# Patient Record
(demographics unavailable — no encounter records)

---

## 2025-01-07 NOTE — REASON FOR VISIT
[Follow-Up] : a follow-up visit [Sleep Apnea] : sleep apnea [TextEntry] : Patient states he has been using the CPAP but he does not see a significant difference in his quality of sleep.

## 2025-01-07 NOTE — DISCUSSION/SUMMARY
[FreeTextEntry1] : Mr. Corcoran has severe sleep apnea.  He is using current machine and feels fair.  He does require high pressures and has discomfort with the mask.  I discussed with him possible in lab CPAP titration he defers at this time.  He is finding significant humidity in the tubing and I have asked him to decrease humidifier and placed the machine and the tubing well below the level of the mass of the water will run down.  He will try this for another 3 months. The patient understands and agrees with plan of care. Today's office visit encompassed 32 minutes which excludes teaching and separately reported services.. I conducted an extensive history, physical exam and reviewed diagnosis and treatment options including diagnostic tests,radiology studies including cat scans and the use of prescription medication.

## 2025-01-07 NOTE — HISTORY OF PRESENT ILLNESS
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

## 2025-01-07 NOTE — PROCEDURE
[FreeTextEntry1] : Compliance report 11/18/2024 to 1/6/2025 96% of days used.  92% of days greater than 4 hours.  AHI 6.9.  There is good compliance with fair to good results.  Median pressure 13 maximum 18 pressure set 5-20.

## 2025-04-17 NOTE — DISCUSSION/SUMMARY
[FreeTextEntry1] : Mr Corcoran has severe obstructive sleep apnea.  He is using machine properly with an AHI 3.9 which is excellent compliance and acceptable results he still snores and does not feel that he is as well rested as he would hope.  He is taking Zyrtec which is sedating in some instances.  I favor holding the Zyrtec and starting loratadine for the next several weeks.  If this is not helpful then I recommend an in-lab CPAP titration.  He once went to an in-lab study and was very costly.  I encouraged him to investigate the cost through his insurance company.  He will try the loratadine and inform me of the results. The patient understands and agrees with plan of care. Today's office visit encompassed 32 minutes which excludes teaching and separately reported services.. I conducted an extensive history, physical exam and reviewed diagnosis and treatment options including diagnostic tests,radiology studies including cat scans and the use of prescription medication.   NB patient is using CPAP and is benefiting from therapy.  Has some somnolence which I think is medication induced.  Revealed marked improvement in his AHI.  Patient should continue CPAP at this time.

## 2025-04-17 NOTE — PROCEDURE
[FreeTextEntry1] : Compliance report 1/8/2025 through 4/7/2020 2597% of days used 97% of days greater than 4 hours AHI 3.9 this is excellent compliance.

## 2025-04-17 NOTE — HISTORY OF PRESENT ILLNESS
[TextBox_4] : 65 male hx severe  obstructive sleep apnea  using cpap and ahi 3.9  co feeling tired  and can nap in afternoon with TV feels tired upon arising  +snore  does not nap during day   tired after work day [TextBox_158] : [employer]

## 2025-07-29 NOTE — PROCEDURE
[FreeTextEntry1] : Compliance report April 30, 2025 to July 28, 2025 100% of days used for 94% days greater than 4 hours AHI 3.8 this is excellent compliance

## 2025-07-29 NOTE — DISCUSSION/SUMMARY
[FreeTextEntry1] : Mr Corcoran has severe obstructive sleep apnea.  He was feeling tired even with good compliance and usage of the machine and it turns out it was secondary to his antihistamine use.  I have told him not to use Zyrtec as it tends to be the most sedating and use loratadine which seems to be at least sedating.  Overall he is doing well and we will continue all therapy The patient understands and agrees with plan of care. Today's office visit encompassed 32 minutes which excludes teaching and separately reported services.. I conducted an extensive history, physical exam and reviewed diagnosis and treatment options including diagnostic tests,radiology studies including cat scans and the use of prescription medication.

## 2025-07-29 NOTE — HISTORY OF PRESENT ILLNESS
[Never] : never [TextBox_4] : 66 male hx  obstructive sleep apnea on apap and felt not feeling much better stopped antihistamine and here for fu feels good on machine off anithistamine  feel rested in am  no nap during day

## 2025-07-29 NOTE — REASON FOR VISIT
[Follow-Up] : a follow-up visit [Sleep Apnea] : sleep apnea [TextEntry] : Patient here for 4 month f/u. No breathing complaint. Occasional SOB. Been using the CPAP machine.